# Patient Record
Sex: MALE | Race: WHITE | NOT HISPANIC OR LATINO
[De-identification: names, ages, dates, MRNs, and addresses within clinical notes are randomized per-mention and may not be internally consistent; named-entity substitution may affect disease eponyms.]

---
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
## 2023-06-17 ENCOUNTER — EMERGENCY (EMERGENCY)
Facility: HOSPITAL | Age: 23
LOS: 1 days | Discharge: HOME | End: 2023-06-17
Attending: EMERGENCY MEDICINE | Admitting: EMERGENCY MEDICINE
Payer: COMMERCIAL

## 2023-06-17 VITALS
HEIGHT: 74 IN | WEIGHT: 199.96 LBS | SYSTOLIC BLOOD PRESSURE: 125 MMHG | DIASTOLIC BLOOD PRESSURE: 72 MMHG | HEART RATE: 87 BPM | RESPIRATION RATE: 18 BRPM | TEMPERATURE: 97 F | OXYGEN SATURATION: 98 %

## 2023-06-17 PROCEDURE — 70450 CT HEAD/BRAIN W/O DYE: CPT | Mod: 26,MA

## 2023-06-17 PROCEDURE — 72125 CT NECK SPINE W/O DYE: CPT | Mod: 26,MA

## 2023-06-17 PROCEDURE — 99284 EMERGENCY DEPT VISIT MOD MDM: CPT | Mod: 25

## 2023-06-17 PROCEDURE — 90715 TDAP VACCINE 7 YRS/> IM: CPT

## 2023-06-17 PROCEDURE — 70450 CT HEAD/BRAIN W/O DYE: CPT | Mod: MA

## 2023-06-17 PROCEDURE — 90471 IMMUNIZATION ADMIN: CPT

## 2023-06-17 PROCEDURE — 12002 RPR S/N/AX/GEN/TRNK2.6-7.5CM: CPT

## 2023-06-17 PROCEDURE — 72125 CT NECK SPINE W/O DYE: CPT | Mod: MA

## 2023-06-17 RX ORDER — TETANUS TOXOID, REDUCED DIPHTHERIA TOXOID AND ACELLULAR PERTUSSIS VACCINE, ADSORBED 5; 2.5; 8; 8; 2.5 [IU]/.5ML; [IU]/.5ML; UG/.5ML; UG/.5ML; UG/.5ML
0.5 SUSPENSION INTRAMUSCULAR ONCE
Refills: 0 | Status: COMPLETED | OUTPATIENT
Start: 2023-06-17 | End: 2023-06-17

## 2023-06-17 RX ADMIN — TETANUS TOXOID, REDUCED DIPHTHERIA TOXOID AND ACELLULAR PERTUSSIS VACCINE, ADSORBED 0.5 MILLILITER(S): 5; 2.5; 8; 8; 2.5 SUSPENSION INTRAMUSCULAR at 05:38

## 2023-06-17 NOTE — ED PROVIDER NOTE - CLINICAL SUMMARY MEDICAL DECISION MAKING FREE TEXT BOX
22-year-old male no significant past medical history status post fall off a barstool at home hit the top of his head against a  denies any LOC neck pain.  Noted to have a scalp laceration.  Father shaved his hair to expose the area for better visualization.  Unsure about last tetanus shot.  No nausea no vomiting.  Here with friend at bedside.    r/o ich fx, CT head/neck, laceration repair with staples, return in 7-10 days for staple removal

## 2023-06-17 NOTE — ED PROVIDER NOTE - PATIENT PORTAL LINK FT
You can access the FollowMyHealth Patient Portal offered by [facility] by registering at the following website: [email or web address]. By joining [facility]’s FollowMyHealth portal, you will also be able to view your health information using other applications (apps) compatible with our system.

## 2023-06-17 NOTE — ED ADULT NURSE NOTE - OBJECTIVE STATEMENT
Pt was drinking alcohol came home from the bar and hit his head on a metal cabinet. Pt has a laceration to top of his head.

## 2023-06-17 NOTE — ED ADULT NURSE NOTE - NSFALLHARMRISKINTERV_ED_ALL_ED
Assistance OOB with selected safe patient handling equipment if applicable/Assistance with ambulation/Communicate risk of Fall with Harm to all staff, patient, and family/Monitor gait and stability/Monitor for mental status changes and reorient to person, place, and time, as needed/Provide visual cue: red socks, yellow wristband, yellow gown, etc/Reinforce activity limits and safety measures with patient and family/Toileting schedule using arm’s reach rule for commode and bathroom/Use of alarms - bed, stretcher, chair and/or video monitoring/Bed in lowest position, wheels locked, appropriate side rails in place/Call bell, personal items and telephone in reach/Instruct patient to call for assistance before getting out of bed/chair/stretcher/Non-slip footwear applied when patient is off stretcher/[place] to call system/Physically safe environment - no spills, clutter or unnecessary equipment/Purposeful Proactive Rounding/Room/bathroom lighting operational, light cord in reach

## 2023-06-17 NOTE — ED PROVIDER NOTE - PHYSICAL EXAMINATION
Gen: Alert, NAD  Head/eyes: NC/+top of scalp curvilinear laceration 4cm , PERRL  ENT: airway patent  Neck: supple  Pulm/lung: Bilateral clear BS  CV/heart: RRR  GI/Abd: soft, NT/ND, +BS, no guarding/rebound tenderness  Musculoskeletal: no edema/erythema/cyanosis  Skin: no rash  Neuro: AAOx3, grossly intact

## 2023-06-17 NOTE — ED PROVIDER NOTE - NSFOLLOWUPINSTRUCTIONS_ED_ALL_ED_FT
1) Return in 7-10 days for staple removal.   2) Return to Emergency room for any worsening or persistent pain, weakness, fever, or any other concerning symptoms.  3) See attached instruction sheets for additional information, including information regarding signs and symptoms to look out for, reasons to seek immediate care and other important instructions.  4) Follow-up with any specialists as discussed / noted as well.       How do I care for my wound?    Clean:  You may be able to shower in 24 hours. Do not soak your wound under water.    Gently wash your wound with soap and warm water daily. Lightly pat it dry. Do not cover your wound unless your healthcare provider tells you to.    You may also need to clean your wound with a mixture of hydrogen peroxide and water. Ask your healthcare provider how to do this.    Do not apply ointment or cream to the wound unless your healthcare provider tells you to.    Elevate:  Rest any arm or leg that has a wound on pillows above the level of your heart. Do this as often as possible for 2 days. This will help decrease swelling and pain.      Minimize scarring:  Avoid sunshine on your wound to reduce scarring.    When should I follow up with my healthcare provider? You may need to return for a wound checkup 3 days after your staples are placed. Ask your healthcare provider when to return to get your staples removed. Your healthcare provider will take your staples out as soon as possible to reduce scarring. Face staples may be removed within 3 to 5 days. Scalp, arm, and leg staples may be removed within 7 to 10 days. Joint, palm, and feet staples may be removed within 10 to 14 days.    How will my staples be removed?    A medical staple remover will be used to take out your staples. Your healthcare provider will slide the tool under each staple, squeeze the handle, and gently pull the staple out.    Medical tape will be placed on your wound once your staples are removed. This will help keep your wound closed. The medical tape will fall off on its own after several days.  When should I contact my healthcare provider?    You have redness, pain, swelling, or pus draining from your wound.    Your pain medicine does not relieve your pain.    You have a fever of 101°F (38.5°C) or higher.    You have an odor coming from your wound.    You have questions or concerns about your condition or care.  When should I seek immediate care?    Your wound reopens.    You have red streaks on your skin that spread out from your wound.    You have severe pain or vomiting.  CARE AGREEMENT:    You have the right to help plan your care. Learn about your health condition and how it may be treated. Discuss treatment options with your healthcare providers to decide what care you want to receive. You always have the right to refuse treatment.    © [employer] 1973, 2023

## 2023-06-17 NOTE — ED PROVIDER NOTE - OBJECTIVE STATEMENT
22-year-old male no significant past medical history status post fall off a barstool at home hit the top of his head against a  denies any LOC neck pain.  Noted to have a scalp laceration.  Father shaved his hair to expose the area for better visualization.  Unsure about last tetanus shot.  No nausea no vomiting.  Here with friend at bedside.

## 2023-06-17 NOTE — ED ADULT TRIAGE NOTE - CHIEF COMPLAINT QUOTE
22 yr old male c/o head injury. Intoxicated. Friend reports pt fell backward from a high bar stool and hit his head on a drawer handle during fall. Denies LOC, NV. Last tetanus unknown. 22 yr old male c/o head injury. Intoxicated. Friend reports pt fell backward from a high bar stool and hit his head on a drawer handle during fall. Denies anticoagulants, LOC, NV. Last tetanus unknown.

## 2023-06-17 NOTE — ED ADULT NURSE NOTE - CHIEF COMPLAINT QUOTE
22 yr old male c/o head injury. Intoxicated. Friend reports pt fell backward from a high bar stool and hit his head on a drawer handle during fall. Denies anticoagulants, LOC, NV. Last tetanus unknown.

## 2024-02-26 NOTE — ED ADULT TRIAGE NOTE - PAIN: PRESENCE, MLM
Plan:  Physical Therapy: none  Continue stretching at home  Clinical Health Psychologist to address issues of relaxation, behavioral change, coping style, and other factors important to improvement: none  Diagnostic Studies: none  Medication Management:   Continue Butrans 20mcg/hr transdermal change every 7 days, fill 2/27/2024 and start 2/27/2024.  continue Baclofen to 10mg up to three times daily as needed for muscle spasms  Continue Voltaren gel as needed  Further procedures recommended: none  Recommendations/follow-up for PCP:  See above  Release of information: none  Signed CSA today  UDT today, wearing Butrans patch  Follow up: 12 weeks for in-person or virtual visit.  Please call [phone] to make your follow-up appointment with me.     ----------------------------------------------------------------  Clinic Number:  [phone]   Call with any questions about your care and for scheduling assistance.   Calls are returned Monday through Friday between 8 AM and 4:30 PM. We usually get back to you within 2 business days depending on the issue/request.    If we are prescribing your medications:  For opioid medication refills, call the clinic or send a [employer] message 7 days in advance.  Please include:  Name of requested medication  Name of the pharmacy.  For non-opioid medications, call your pharmacy directly to request a refill. Please allow 3-4 days to be processed.   Per [place] State Law:  All controlled substance prescriptions must be filled within 30 days of being written.    For those controlled substances allowing refills, pickup must occur within 30 days of last fill.      We believe regular attendance is key to your success in our program!    Any time you are unable to keep your appointment we ask that you call us at least 24 hours in advance to cancel.This will allow us to offer the appointment time to another patient.   Multiple missed appointments may lead to dismissal from the clinic.   
denies pain/discomfort (Rating = 0)